# Patient Record
Sex: FEMALE | HISPANIC OR LATINO
[De-identification: names, ages, dates, MRNs, and addresses within clinical notes are randomized per-mention and may not be internally consistent; named-entity substitution may affect disease eponyms.]

---

## 2020-06-08 PROBLEM — Z00.00 ENCOUNTER FOR PREVENTIVE HEALTH EXAMINATION: Status: ACTIVE | Noted: 2020-06-08

## 2020-06-09 ENCOUNTER — APPOINTMENT (OUTPATIENT)
Dept: ENDOCRINOLOGY | Facility: CLINIC | Age: 60
End: 2020-06-09

## 2020-07-02 ENCOUNTER — APPOINTMENT (OUTPATIENT)
Dept: ENDOCRINOLOGY | Facility: CLINIC | Age: 60
End: 2020-07-02
Payer: MEDICAID

## 2020-07-02 VITALS
HEIGHT: 61 IN | WEIGHT: 134 LBS | BODY MASS INDEX: 25.3 KG/M2 | HEART RATE: 80 BPM | SYSTOLIC BLOOD PRESSURE: 126 MMHG | DIASTOLIC BLOOD PRESSURE: 78 MMHG

## 2020-07-02 LAB
GLUCOSE BLDC GLUCOMTR-MCNC: 203
HBA1C MFR BLD HPLC: 10.9

## 2020-07-02 PROCEDURE — 83036 HEMOGLOBIN GLYCOSYLATED A1C: CPT | Mod: QW

## 2020-07-02 PROCEDURE — 82962 GLUCOSE BLOOD TEST: CPT

## 2020-07-02 PROCEDURE — 99205 OFFICE O/P NEW HI 60 MIN: CPT | Mod: 25

## 2020-07-03 NOTE — PHYSICAL EXAM
[Well Nourished] : well nourished [Well Developed] : well developed [No Acute Distress] : no acute distress [Alert] : alert [EOMI] : extra ocular movement intact [No Proptosis] : no proptosis [Normal Sclera/Conjunctiva] : normal sclera/conjunctiva [No Thyroid Nodules] : no palpable thyroid nodules [Thyroid Not Enlarged] : the thyroid was not enlarged [Normal Oropharynx] : the oropharynx was normal [No Accessory Muscle Use] : no accessory muscle use [No Respiratory Distress] : no respiratory distress [Regular Rhythm] : with a regular rhythm [Normal Rate] : heart rate was normal [Clear to Auscultation] : lungs were clear to auscultation bilaterally [Normal S1, S2] : normal S1 and S2 [No Edema] : no peripheral edema [Normal Bowel Sounds] : normal bowel sounds [Pedal Pulses Normal] : the pedal pulses are present [Not Distended] : not distended [Normal Anterior Cervical Nodes] : no anterior cervical lymphadenopathy [Not Tender] : non-tender [Soft] : abdomen soft [No Spinal Tenderness] : no spinal tenderness [Normal Posterior Cervical Nodes] : no posterior cervical lymphadenopathy [No Stigmata of Cushings Syndrome] : no stigmata of Cushings Syndrome [Spine Straight] : spine straight [Normal Strength/Tone] : muscle strength and tone were normal [Normal Gait] : normal gait [No Rash] : no rash [Acanthosis Nigricans] : no acanthosis nigricans [Normal Reflexes] : deep tendon reflexes were 2+ and symmetric [No Tremors] : no tremors [Oriented x3] : oriented to person, place, and time

## 2020-07-03 NOTE — HISTORY OF PRESENT ILLNESS
[FreeTextEntry1] : 59 y/o F w/ Hx of DM2. HTN, HLD\par here for initial evaluation and management of metabolic complaints\par generally feels well and endorses no acute complaints.\par reports diabetes diagnosis ~ 25 y/a during last pregnancy. notes she had always been on pills up until 10 years ago. has been on levemir since, now using it as 30 units BID, but openly admits to skipping doses ~ 4 times weekly. Also on janumet 50/1000 mg BID. good tolerance reported. no known micro macro vascular complications . no past DKA  ep. no hypoglycemia reported, but she seldom monitors at home. She otherwise denies any f/c, CP, SOB, palpitations, tremors, depressed mood, anxiety, palpitations, n/v, stool/urinary abn, skin/weight changes, heat/cold intolerance, HAs, breast/nipple changes, polyuria/polydipsia/nocturia or other complaints.\par \par

## 2020-07-03 NOTE — ASSESSMENT
[Diabetes Foot Care] : diabetes foot care [Long Term Vascular Complications] : long term vascular complications of diabetes [Importance of Diet and Exercise] : importance of diet and exercise to improve glycemic control, achieve weight loss and improve cardiovascular health [Carbohydrate Consistent Diet] : carbohydrate consistent diet [Hypoglycemia Management] : hypoglycemia management [Action and use of Insulin] : action and use of short and long-acting insulin [Self Monitoring of Blood Glucose] : self monitoring of blood glucose [Other____] : Risks and benefits of [unfilled] was discussed with the patient. [FreeTextEntry1] : 1) DM2: Uncontrolled, A1C  target of <7%. Natural hx of the disease and importance of treatment targets discussed at length, she verbalized understanding. ADA diet and importance of exercise discussed at length. Plan is to increase metformin to full dose and introduce GLP-1 agonist today (Trulicity). Stop Janumet and reduce levemir to 50 units QD, titration instructions provided. Refer to Nutritionist today. We vineet check microalbumin, lipids and labs on the NV. Discuss vaccines and podiatry/opthalmology referrals on NV (eye exam referral provided today.\par \par 2) Weight gain:  complicated by DM2. Discussed medical  strategies. Pt would like to try lifestyle modifications and GLP-1 agonist therapy at this time. Reassess on the NV for at least ~5% TBW loss.\par \par 3) Essential HTN: Pt is at goal BP and on an ACE inh. Reassess microalbumin prior to the NV.\par  \par 4) Dyslipidemia: Pt is on a moderate intensity statin. REassess lipids on the next visit. LDL target <100.\par

## 2020-07-23 ENCOUNTER — APPOINTMENT (OUTPATIENT)
Dept: ENDOCRINOLOGY | Facility: CLINIC | Age: 60
End: 2020-07-23

## 2020-09-03 ENCOUNTER — APPOINTMENT (OUTPATIENT)
Dept: ENDOCRINOLOGY | Facility: CLINIC | Age: 60
End: 2020-09-03
Payer: MEDICAID

## 2020-09-03 VITALS
HEART RATE: 83 BPM | BODY MASS INDEX: 25.13 KG/M2 | WEIGHT: 133 LBS | SYSTOLIC BLOOD PRESSURE: 123 MMHG | DIASTOLIC BLOOD PRESSURE: 74 MMHG

## 2020-09-03 LAB
GLUCOSE BLDC GLUCOMTR-MCNC: 300
HBA1C MFR BLD HPLC: 10.4

## 2020-09-03 PROCEDURE — 82962 GLUCOSE BLOOD TEST: CPT

## 2020-09-03 PROCEDURE — 99214 OFFICE O/P EST MOD 30 MIN: CPT | Mod: 25

## 2020-09-03 PROCEDURE — 83036 HEMOGLOBIN GLYCOSYLATED A1C: CPT | Mod: QW

## 2020-09-04 NOTE — PHYSICAL EXAM
[Alert] : alert [Well Nourished] : well nourished [No Acute Distress] : no acute distress [Well Developed] : well developed [Normal Sclera/Conjunctiva] : normal sclera/conjunctiva [No Proptosis] : no proptosis [EOMI] : extra ocular movement intact [Thyroid Not Enlarged] : the thyroid was not enlarged [No Thyroid Nodules] : no palpable thyroid nodules [Normal Oropharynx] : the oropharynx was normal [No Respiratory Distress] : no respiratory distress [No Accessory Muscle Use] : no accessory muscle use [Clear to Auscultation] : lungs were clear to auscultation bilaterally [Normal Rate] : heart rate was normal [Normal S1, S2] : normal S1 and S2 [No Edema] : no peripheral edema [Regular Rhythm] : with a regular rhythm [Pedal Pulses Normal] : the pedal pulses are present [Normal Bowel Sounds] : normal bowel sounds [Not Tender] : non-tender [Not Distended] : not distended [Soft] : abdomen soft [Normal Anterior Cervical Nodes] : no anterior cervical lymphadenopathy [No Spinal Tenderness] : no spinal tenderness [Spine Straight] : spine straight [No Stigmata of Cushings Syndrome] : no stigmata of Cushings Syndrome [Normal Gait] : normal gait [Normal Strength/Tone] : muscle strength and tone were normal [No Rash] : no rash [Acanthosis Nigricans] : no acanthosis nigricans [Normal Reflexes] : deep tendon reflexes were 2+ and symmetric [No Tremors] : no tremors [Oriented x3] : oriented to person, place, and time

## 2020-09-04 NOTE — ASSESSMENT
[FreeTextEntry1] : 1) DM2: Uncontrolled, A1C  target of <7%. Natural hx of the disease and importance of treatment targets discussed at length, she verbalized understanding. ADA diet and importance of exercise discussed at length. Plan is to increase metformin to full dose and increase GLP-1 agonist to max dose(Trulicity). Stop Janumet and reduce levemir to 50 units QD, titration instructions provided. Refer to Nutritionist today. We vineet check microalbumin, lipids and labs on the NV. Discuss vaccines and podiatry/opthalmology referrals on NV (eye exam referral provided today.\par \par 2) Weight gain:  complicated by DM2. Discussed medical  strategies. Pt would like to try lifestyle modifications and GLP-1 agonist therapy at this time. Reassess on the NV for at least ~5% TBW loss.\par \par 3) Essential HTN: Pt is at goal BP and on an ACE inh. Reassess microalbumin prior to the NV.\par  \par 4) Dyslipidemia: Pt is on a moderate intensity statin. REassess lipids on the next visit. LDL target <100.\par  [Diabetes Foot Care] : diabetes foot care [Long Term Vascular Complications] : long term vascular complications of diabetes [Carbohydrate Consistent Diet] : carbohydrate consistent diet [Importance of Diet and Exercise] : importance of diet and exercise to improve glycemic control, achieve weight loss and improve cardiovascular health [Hypoglycemia Management] : hypoglycemia management [Action and use of Insulin] : action and use of short and long-acting insulin [Self Monitoring of Blood Glucose] : self monitoring of blood glucose [Other____] : Risks and benefits of [unfilled] was discussed with the patient.

## 2020-09-04 NOTE — HISTORY OF PRESENT ILLNESS
[FreeTextEntry1] : 59 y/o F w/ Hx of DM2. HTN, HLD\par here for initial evaluation and management of metabolic complaints\par generally feels well and endorses no acute complaints.\par reports diabetes diagnosis ~ 25 y/a during last pregnancy. notes she had always been on pills up until 10 years ago. has been on levemir since, now using it as 30 units BID, but openly admits to skipping doses ~ 4 times weekly. Also on janumet 50/1000 mg BID. good tolerance reported. no known micro macro vascular complications . no past DKA  ep. no hypoglycemia reported, but she seldom monitors at home. \par \par 9/2020: Here for /fu, generally feels well and endorses no acute complaints. No interval events since LV. Today reports adequate tolerance to GLP-1 agonist, notes some improvement in fasting control, now consistently ~ 180. post prandial readings remain ~180. no hypoglycemia reported.\par She otherwise denies any f/c, CP, SOB, palpitations, tremors, depressed mood, anxiety, palpitations, n/v, stool/urinary abn, skin/weight changes, heat/cold intolerance, HAs, breast/nipple changes, polyuria/polydipsia/nocturia or other complaints.\par \par

## 2020-09-30 ENCOUNTER — RX RENEWAL (OUTPATIENT)
Age: 60
End: 2020-09-30

## 2020-10-27 ENCOUNTER — RX RENEWAL (OUTPATIENT)
Age: 60
End: 2020-10-27

## 2020-12-02 ENCOUNTER — APPOINTMENT (OUTPATIENT)
Dept: ENDOCRINOLOGY | Facility: CLINIC | Age: 60
End: 2020-12-02
Payer: COMMERCIAL

## 2020-12-02 VITALS
BODY MASS INDEX: 26.08 KG/M2 | WEIGHT: 138 LBS | DIASTOLIC BLOOD PRESSURE: 74 MMHG | SYSTOLIC BLOOD PRESSURE: 121 MMHG | HEART RATE: 90 BPM

## 2020-12-02 LAB
GLUCOSE BLDC GLUCOMTR-MCNC: 406
HBA1C MFR BLD HPLC: 10

## 2020-12-02 PROCEDURE — 99215 OFFICE O/P EST HI 40 MIN: CPT | Mod: 25

## 2020-12-02 PROCEDURE — 99072 ADDL SUPL MATRL&STAF TM PHE: CPT

## 2020-12-02 PROCEDURE — 83036 HEMOGLOBIN GLYCOSYLATED A1C: CPT | Mod: QW

## 2020-12-02 PROCEDURE — 82962 GLUCOSE BLOOD TEST: CPT

## 2020-12-04 NOTE — HISTORY OF PRESENT ILLNESS
[FreeTextEntry1] : 59 y/o F w/ Hx of DM2. HTN, HLD\par here for initial evaluation and management of metabolic complaints\par generally feels well and endorses no acute complaints.\par reports diabetes diagnosis ~ 25 y/a during last pregnancy. notes she had always been on pills up until 10 years ago. has been on levemir since, now using it as 30 units BID, but openly admits to skipping doses ~ 4 times weekly. Also on janumet 50/1000 mg BID. good tolerance reported. no known micro macro vascular complications . no past DKA  ep. no hypoglycemia reported, but she seldom monitors at home. \par \par 11/2020: Here for /fu, generally feels well and endorses no acute complaints. No interval events since LV. Today reports adequate tolerance to GLP-1 agonist, notes some improvement in fasting control, now consistently ~ 180. post prandial readings remain ~180. no hypoglycemia reported.\par She otherwise denies any f/c, CP, SOB, palpitations, tremors, depressed mood, anxiety, palpitations, n/v, stool/urinary abn, skin/weight changes, heat/cold intolerance, HAs, breast/nipple changes, polyuria/polydipsia/nocturia or other complaints.\par \par

## 2020-12-04 NOTE — ASSESSMENT
[FreeTextEntry1] : 1) DM2: Uncontrolled, A1C  target of <7%. Natural hx of the disease and importance of treatment targets discussed at length, she verbalized understanding. ADA diet and importance of exercise discussed at length. Plan is to continue metformin to full dose and continue GLP-1 agonist (Trulicity). Stop levemir to 50 units QD, switch to humalog 75/25 mixed insulin, 25 units BID AC. titration instructions provided. Refer to Nutritionist today. We vineet check microalbumin, lipids and labs on the NV. Discuss vaccines and podiatry/opthalmology referrals on NV (eye exam referral provided today.\par \par 2) Weight gain:  complicated by DM2. Discussed medical  strategies. Pt would like to try lifestyle modifications and GLP-1 agonist therapy at this time. Reassess on the NV for at least ~5% TBW loss.\par \par 3) Essential HTN: Pt is at goal BP and on an ACE inh. Reassess microalbumin prior to the NV.\par  \par 4) Dyslipidemia: Pt is on a moderate intensity statin. REassess lipids on the next visit. LDL target <100.\par  [Diabetes Foot Care] : diabetes foot care [Long Term Vascular Complications] : long term vascular complications of diabetes [Carbohydrate Consistent Diet] : carbohydrate consistent diet [Importance of Diet and Exercise] : importance of diet and exercise to improve glycemic control, achieve weight loss and improve cardiovascular health [Hypoglycemia Management] : hypoglycemia management [Action and use of Insulin] : action and use of short and long-acting insulin [Self Monitoring of Blood Glucose] : self monitoring of blood glucose [Other____] : Risks and benefits of [unfilled] was discussed with the patient.

## 2021-02-17 ENCOUNTER — APPOINTMENT (OUTPATIENT)
Dept: ENDOCRINOLOGY | Facility: CLINIC | Age: 61
End: 2021-02-17

## 2021-02-23 ENCOUNTER — RX RENEWAL (OUTPATIENT)
Age: 61
End: 2021-02-23

## 2021-04-20 ENCOUNTER — RX RENEWAL (OUTPATIENT)
Age: 61
End: 2021-04-20

## 2021-09-13 ENCOUNTER — RX RENEWAL (OUTPATIENT)
Age: 61
End: 2021-09-13

## 2021-09-16 ENCOUNTER — RX RENEWAL (OUTPATIENT)
Age: 61
End: 2021-09-16

## 2021-09-28 ENCOUNTER — APPOINTMENT (OUTPATIENT)
Dept: ENDOCRINOLOGY | Facility: CLINIC | Age: 61
End: 2021-09-28
Payer: COMMERCIAL

## 2021-09-28 VITALS
BODY MASS INDEX: 25.7 KG/M2 | DIASTOLIC BLOOD PRESSURE: 76 MMHG | HEART RATE: 85 BPM | WEIGHT: 136 LBS | SYSTOLIC BLOOD PRESSURE: 117 MMHG

## 2021-09-28 DIAGNOSIS — E11.65 TYPE 2 DIABETES MELLITUS WITH UNSPECIFIED COMPLICATIONS: ICD-10-CM

## 2021-09-28 DIAGNOSIS — E11.8 TYPE 2 DIABETES MELLITUS WITH UNSPECIFIED COMPLICATIONS: ICD-10-CM

## 2021-09-28 LAB
GLUCOSE BLDC GLUCOMTR-MCNC: 203
HBA1C MFR BLD HPLC: 9.9

## 2021-09-28 PROCEDURE — 83036 HEMOGLOBIN GLYCOSYLATED A1C: CPT | Mod: QW

## 2021-09-28 PROCEDURE — 99215 OFFICE O/P EST HI 40 MIN: CPT | Mod: 25

## 2021-09-28 PROCEDURE — 36415 COLL VENOUS BLD VENIPUNCTURE: CPT

## 2021-09-28 PROCEDURE — 82962 GLUCOSE BLOOD TEST: CPT

## 2021-09-28 RX ORDER — INSULIN DETEMIR 100 [IU]/ML
100 INJECTION, SOLUTION SUBCUTANEOUS
Qty: 45 | Refills: 0 | Status: COMPLETED | COMMUNITY
Start: 2020-07-02 | End: 2021-09-28

## 2021-09-30 NOTE — HISTORY OF PRESENT ILLNESS
[FreeTextEntry1] : 61 y/o F w/ Hx of DM2. HTN, HLD\par here for initial evaluation and management of metabolic complaints\par generally feels well and endorses no acute complaints.\par reports diabetes diagnosis ~ 25 y/a during last pregnancy. notes she had always been on pills up until 10 years ago. has been on levemir since, now using it as 30 units BID, but openly admits to skipping doses ~ 4 times weekly. Also on janumet 50/1000 mg BID. good tolerance reported. no known micro macro vascular complications . no past DKA  ep. no hypoglycemia reported, but she seldom monitors at home. \par \par 9/2021: Here to re establish care,  lost to f/u x 1 year, generally feels well and endorses no acute complaints. No interval events since LV. Today reports adequate tolerance to GLP-1 agonist, notes some improvement in fasting control, now consistently ~ 180. post prandial readings remain ~180. no hypoglycemia reported. does note that she has been skipping some mixed insulin doses, and is always using it 30 minutes after meals. denies hypoglycemia\par She otherwise denies any f/c, CP, SOB, palpitations, tremors, depressed mood, anxiety, palpitations, n/v, stool/urinary abn, skin/weight changes, heat/cold intolerance, HAs, breast/nipple changes, polyuria/polydipsia/nocturia or other complaints.\par \par

## 2021-09-30 NOTE — ASSESSMENT
[FreeTextEntry1] : 1) DM2: Uncontrolled, A1C  target of <7%. Natural hx of the disease and importance of treatment targets discussed at length, she verbalized understanding. ADA diet and importance of exercise discussed at length. Plan is to continue metformin to full dose and increase GLP-1 agonist (Trulicity 3 mg, as she has failed 1.5 mg dose). , switch to before meal dosing of humalog 75/25 mixed insulin, 30 units BID AC. titration instructions provided. Refer to Nutritionist today. We vineet check microalbumin, lipids and labs on the NV. Discuss vaccines and podiatry/opthalmology referrals on NV. Given high glucose variability in spite of adherence to FSG monitoring 4 times daily, will Rx CGM sensor.\par \par 2) Weight gain:  complicated by DM2. Discussed medical  strategies. Pt would like to try lifestyle modifications and GLP-1 agonist therapy at this time. Reassess on the NV for at least ~5% TBW loss.\par \par 3) Essential HTN: Pt is at goal BP and on an ACE inh. Reassess microalbumin prior to the NV.\par  \par 4) Dyslipidemia: Pt is on a moderate intensity statin. REassess lipids on the next visit. LDL target <100.\par  [Diabetes Foot Care] : diabetes foot care [Long Term Vascular Complications] : long term vascular complications of diabetes [Carbohydrate Consistent Diet] : carbohydrate consistent diet [Importance of Diet and Exercise] : importance of diet and exercise to improve glycemic control, achieve weight loss and improve cardiovascular health [Hypoglycemia Management] : hypoglycemia management [Action and use of Insulin] : action and use of short and long-acting insulin [Self Monitoring of Blood Glucose] : self monitoring of blood glucose [Other____] : Risks and benefits of [unfilled] was discussed with the patient.

## 2021-10-07 LAB
25(OH)D3 SERPL-MCNC: 16.1 NG/ML
ALBUMIN SERPL ELPH-MCNC: 4.5 G/DL
ALP BLD-CCNC: 112 U/L
ALT SERPL-CCNC: 37 U/L
ANION GAP SERPL CALC-SCNC: 17 MMOL/L
AST SERPL-CCNC: 28 U/L
BILIRUB SERPL-MCNC: <0.2 MG/DL
BUN SERPL-MCNC: 14 MG/DL
CALCIUM SERPL-MCNC: 10.4 MG/DL
CHLORIDE SERPL-SCNC: 97 MMOL/L
CHOLEST SERPL-MCNC: 407 MG/DL
CO2 SERPL-SCNC: 24 MMOL/L
CREAT SERPL-MCNC: 0.71 MG/DL
CREAT SPEC-SCNC: 125 MG/DL
FOLATE SERPL-MCNC: 16.7 NG/ML
GLUCOSE SERPL-MCNC: 185 MG/DL
HDLC SERPL-MCNC: 68 MG/DL
LDLC SERPL CALC-MCNC: 274 MG/DL
MICROALBUMIN 24H UR DL<=1MG/L-MCNC: 12.5 MG/DL
MICROALBUMIN/CREAT 24H UR-RTO: 100 MG/G
NONHDLC SERPL-MCNC: 339 MG/DL
POTASSIUM SERPL-SCNC: 4.7 MMOL/L
PROT SERPL-MCNC: 7.6 G/DL
SODIUM SERPL-SCNC: 138 MMOL/L
T4 FREE SERPL-MCNC: 1.2 NG/DL
TRIGL SERPL-MCNC: 326 MG/DL
TSH SERPL-ACNC: 1.82 UIU/ML
VIT B12 SERPL-MCNC: 503 PG/ML

## 2021-12-29 ENCOUNTER — APPOINTMENT (OUTPATIENT)
Dept: ENDOCRINOLOGY | Facility: CLINIC | Age: 61
End: 2021-12-29

## 2022-02-23 ENCOUNTER — APPOINTMENT (OUTPATIENT)
Dept: ENDOCRINOLOGY | Facility: CLINIC | Age: 62
End: 2022-02-23

## 2022-03-16 ENCOUNTER — RX RENEWAL (OUTPATIENT)
Age: 62
End: 2022-03-16

## 2022-06-18 ENCOUNTER — RX RENEWAL (OUTPATIENT)
Age: 62
End: 2022-06-18

## 2022-06-21 ENCOUNTER — RX RENEWAL (OUTPATIENT)
Age: 62
End: 2022-06-21

## 2022-12-20 ENCOUNTER — RX RENEWAL (OUTPATIENT)
Age: 62
End: 2022-12-20

## 2024-02-27 ENCOUNTER — APPOINTMENT (OUTPATIENT)
Dept: ENDOCRINOLOGY | Facility: CLINIC | Age: 64
End: 2024-02-27
Payer: MEDICARE

## 2024-02-27 VITALS
WEIGHT: 130 LBS | DIASTOLIC BLOOD PRESSURE: 66 MMHG | BODY MASS INDEX: 24.56 KG/M2 | SYSTOLIC BLOOD PRESSURE: 127 MMHG | HEART RATE: 85 BPM

## 2024-02-27 LAB — GLUCOSE BLDC GLUCOMTR-MCNC: 413

## 2024-02-27 PROCEDURE — G2211 COMPLEX E/M VISIT ADD ON: CPT | Mod: NC,1L

## 2024-02-27 PROCEDURE — 82962 GLUCOSE BLOOD TEST: CPT

## 2024-02-27 PROCEDURE — 99215 OFFICE O/P EST HI 40 MIN: CPT | Mod: 25

## 2024-02-27 RX ORDER — PEN NEEDLE, DIABETIC 29 G X1/2"
32G X 4 MM NEEDLE, DISPOSABLE MISCELLANEOUS
Qty: 2 | Refills: 3 | Status: ACTIVE | COMMUNITY
Start: 2024-02-27 | End: 1900-01-01

## 2024-02-27 RX ORDER — INSULIN LISPRO 100 [IU]/ML
(75-25) 100 INJECTION, SUSPENSION SUBCUTANEOUS
Qty: 4 | Refills: 1 | Status: ACTIVE | COMMUNITY
Start: 2020-12-02 | End: 1900-01-01

## 2024-02-27 RX ORDER — DULAGLUTIDE 3 MG/.5ML
3 INJECTION, SOLUTION SUBCUTANEOUS
Qty: 3 | Refills: 1 | Status: COMPLETED | COMMUNITY
Start: 2020-07-02 | End: 2024-02-27

## 2024-02-27 NOTE — PHYSICAL EXAM
[Alert] : alert [Well Nourished] : well nourished [No Acute Distress] : no acute distress [Well Developed] : well developed [Normal Sclera/Conjunctiva] : normal sclera/conjunctiva [EOMI] : extra ocular movement intact [No Proptosis] : no proptosis [Normal Oropharynx] : the oropharynx was normal [Thyroid Not Enlarged] : the thyroid was not enlarged [No Thyroid Nodules] : no palpable thyroid nodules [No Respiratory Distress] : no respiratory distress [No Accessory Muscle Use] : no accessory muscle use [Clear to Auscultation] : lungs were clear to auscultation bilaterally [Normal S1, S2] : normal S1 and S2 [Normal Rate] : heart rate was normal [Regular Rhythm] : with a regular rhythm [No Edema] : no peripheral edema [Normal Bowel Sounds] : normal bowel sounds [Pedal Pulses Normal] : the pedal pulses are present [Not Tender] : non-tender [Not Distended] : not distended [Soft] : abdomen soft [No Spinal Tenderness] : no spinal tenderness [Normal Anterior Cervical Nodes] : no anterior cervical lymphadenopathy [Spine Straight] : spine straight [No Stigmata of Cushings Syndrome] : no stigmata of Cushings Syndrome [Normal Strength/Tone] : muscle strength and tone were normal [Normal Gait] : normal gait [No Rash] : no rash [Acanthosis Nigricans] : no acanthosis nigricans [Normal Reflexes] : deep tendon reflexes were 2+ and symmetric [No Tremors] : no tremors [Oriented x3] : oriented to person, place, and time

## 2024-02-27 NOTE — HISTORY OF PRESENT ILLNESS
[FreeTextEntry1] : 63 y/o F w/ Hx of DM2. HTN, HLD here for initial evaluation and management of metabolic complaints generally feels well and endorses no acute complaints. reports diabetes diagnosis ~ 25 y/a during last pregnancy. notes she had always been on pills up until 10 years ago. has been on levemir since, now using it as 30 units BID, but openly admits to skipping doses ~ 4 times weekly. Also on janumet 50/1000 mg BID. good tolerance reported. no known micro macro vascular complications . no past DKA  ep. no hypoglycemia reported, but she seldom monitors at home.   2/2024: Here to re establish care,  lost to f/u x 3 year, generally feels well and endorses no acute complaints. No interval events since LV. Today reports cessation of GLP-1 agonist 3 years ago, admits to ongoing un compliance w/ all meds. post prandial readings remain ~400, but she seldom checks. no hypoglycemia reported. does note that she has been skipping some mixed insulin doses, admits to only using it before dinner, and is always using it 30 minutes after meals. denies hypoglycemia She otherwise denies any f/c, CP, SOB, palpitations, tremors, depressed mood, anxiety, palpitations, n/v, stool/urinary abn, skin/weight changes, heat/cold intolerance, HAs, breast/nipple changes, polyuria/polydipsia/nocturia or other complaints.

## 2024-02-27 NOTE — ASSESSMENT
[Diabetes Foot Care] : diabetes foot care [Long Term Vascular Complications] : long term vascular complications of diabetes [Carbohydrate Consistent Diet] : carbohydrate consistent diet [Importance of Diet and Exercise] : importance of diet and exercise to improve glycemic control, achieve weight loss and improve cardiovascular health [Hypoglycemia Management] : hypoglycemia management [Action and use of Insulin] : action and use of short and long-acting insulin [Self Monitoring of Blood Glucose] : self monitoring of blood glucose [Other____] : Risks and benefits of [unfilled] was discussed with the patient. [FreeTextEntry1] : 1) DM2: Uncontrolled, A1C  target of <7%. Natural hx of the disease and importance of treatment targets discussed at length, she verbalized understanding but is unable to commit to improved compliance. denies SIHI. risks of ongoing un compliance w/ insulin reviewed at [facility] with her and her daughter. risks include CV disease, diabetic coma and death. ADA diet and importance of exercise discussed at length. Plan is to hold metformin pending labs, switch to before meal dosing of humalog 75/25 mixed insulin, 34 units BID AC. titration instructions provided. Refer to Nutritionist today. We vineet check microalbumin, lipids and labs on the NV. Discuss vaccines and podiatry/opthalmology referrals on NV.   2) Weight loss, likely from catabolic state, r/o thyroid dysfunction.  3) Essential HTN: Pt is at goal BP and on an ACE inh. Reassess microalbumin prior to the NV.   4) Dyslipidemia: Pt is on a moderate intensity statin. REassess lipids on the next visit. LDL target <100.

## 2024-03-07 RX ORDER — INSULIN LISPRO 100 [IU]/ML
(75-25) 100 INJECTION, SUSPENSION SUBCUTANEOUS
Qty: 4 | Refills: 1 | Status: ACTIVE | COMMUNITY
Start: 2024-03-07 | End: 1900-01-01

## 2024-03-12 RX ORDER — INSULIN ASPART 100 [IU]/ML
(70-30) 100 INJECTION, SUSPENSION SUBCUTANEOUS
Qty: 4 | Refills: 3 | Status: ACTIVE | COMMUNITY
Start: 2024-03-12 | End: 1900-01-01

## 2024-05-01 ENCOUNTER — APPOINTMENT (OUTPATIENT)
Dept: ENDOCRINOLOGY | Facility: CLINIC | Age: 64
End: 2024-05-01
Payer: MEDICARE

## 2024-05-01 VITALS
BODY MASS INDEX: 25.13 KG/M2 | HEART RATE: 82 BPM | SYSTOLIC BLOOD PRESSURE: 120 MMHG | DIASTOLIC BLOOD PRESSURE: 70 MMHG | WEIGHT: 133 LBS

## 2024-05-01 DIAGNOSIS — I10 ESSENTIAL (PRIMARY) HYPERTENSION: ICD-10-CM

## 2024-05-01 DIAGNOSIS — E11.65 TYPE 2 DIABETES MELLITUS WITH HYPERGLYCEMIA: ICD-10-CM

## 2024-05-01 DIAGNOSIS — E78.5 HYPERLIPIDEMIA, UNSPECIFIED: ICD-10-CM

## 2024-05-01 DIAGNOSIS — Z91.199 PATIENT'S NONCOMPLIANCE WITH OTHER MEDICAL TREATMENT AND REGIMEN DUE TO UNSPECIFIED REASON: ICD-10-CM

## 2024-05-01 DIAGNOSIS — Z79.4 TYPE 2 DIABETES MELLITUS WITH HYPERGLYCEMIA: ICD-10-CM

## 2024-05-01 DIAGNOSIS — E66.9 OBESITY, UNSPECIFIED: ICD-10-CM

## 2024-05-01 PROCEDURE — 82962 GLUCOSE BLOOD TEST: CPT

## 2024-05-01 PROCEDURE — 99215 OFFICE O/P EST HI 40 MIN: CPT

## 2024-05-01 PROCEDURE — 83036 HEMOGLOBIN GLYCOSYLATED A1C: CPT | Mod: QW

## 2024-05-01 PROCEDURE — G2211 COMPLEX E/M VISIT ADD ON: CPT

## 2024-05-03 NOTE — HISTORY OF PRESENT ILLNESS
[FreeTextEntry1] : 65 y/o F w/ Hx of DM2. HTN, HLD here for initial evaluation and management of metabolic complaints generally feels well and endorses no acute complaints. reports diabetes diagnosis ~ 25 y/a during last pregnancy. notes she had always been on pills up until 10 years ago. has been on levemir since, now using it as 30 units BID, but openly admits to skipping doses ~ 4 times weekly. Also on janumet 50/1000 mg BID. good tolerance reported. no known micro macro vascular complications . no past DKA  ep. no hypoglycemia reported, but she seldom monitors at home.   5/2024: Here for f/u, generally feels well and endorses no acute complaints. No interval events since LV. Today reports cessation of GLP-1 agonist 3 years ago, admits to ongoing un compliance w/ all meds. post prandial readings remain ~400, but she seldom checks. no hypoglycemia reported. does note that she has been skipping some mixed insulin doses, admits to only using it before dinner, and is always using it 30 minutes after meals. denies hypoglycemia She otherwise denies any f/c, CP, SOB, palpitations, tremors, depressed mood, anxiety, palpitations, n/v, stool/urinary abn, skin/weight changes, heat/cold intolerance, HAs, breast/nipple changes, polyuria/polydipsia/nocturia or other complaints.

## 2024-05-03 NOTE — ASSESSMENT
[Diabetes Foot Care] : diabetes foot care [Long Term Vascular Complications] : long term vascular complications of diabetes [Carbohydrate Consistent Diet] : carbohydrate consistent diet [Importance of Diet and Exercise] : importance of diet and exercise to improve glycemic control, achieve weight loss and improve cardiovascular health [Hypoglycemia Management] : hypoglycemia management [Action and use of Insulin] : action and use of short and long-acting insulin [Self Monitoring of Blood Glucose] : self monitoring of blood glucose [Other____] : Risks and benefits of [unfilled] was discussed with the patient. [FreeTextEntry1] : 1) DM2: Uncontrolled, A1C  target of <7%. Natural hx of the disease and importance of treatment targets discussed at length, she verbalized understanding but is unable to commit to improved compliance. denies SIHI. risks of ongoing un compliance w/ insulin reviewed at [facility] with her and her daughter. risks include CV disease, diabetic coma and death. ADA diet and importance of exercise discussed at length. Plan is to hold metformin pending labs, switch to before meal dosing of humalog 75/25 mixed insulin, 40 units BID AC. titration instructions provided. Refer to Nutritionist today. We vineet check microalbumin, lipids and labs on the NV. Discuss vaccines and podiatry/opthalmology referrals on NV.   2) Weight loss, likely from catabolic state, r/o thyroid dysfunction.  3) Essential HTN: Pt is at goal BP and on an ACE inh. Reassess microalbumin prior to the NV.   4) Dyslipidemia: Pt is on a moderate intensity statin. REassess lipids on the next visit. LDL target <100.

## 2024-05-08 LAB
ALBUMIN SERPL ELPH-MCNC: 4.2 G/DL
ALP BLD-CCNC: 156 U/L
ALT SERPL-CCNC: 15 U/L
ANION GAP SERPL CALC-SCNC: 15 MMOL/L
AST SERPL-CCNC: 15 U/L
BILIRUB SERPL-MCNC: 0.2 MG/DL
BUN SERPL-MCNC: 17 MG/DL
C PEPTIDE SERPL-MCNC: 7.1 NG/ML
CALCIUM SERPL-MCNC: 10 MG/DL
CHLORIDE SERPL-SCNC: 96 MMOL/L
CHOLEST SERPL-MCNC: 362 MG/DL
CO2 SERPL-SCNC: 21 MMOL/L
CREAT SERPL-MCNC: 0.76 MG/DL
CREAT SPEC-SCNC: 72 MG/DL
EGFR: 86 ML/MIN/1.73M2
FOLATE SERPL-MCNC: >20 NG/ML
GAD65 AB SER-MCNC: 0.01 NMOL/L
GLUCOSE BLDC GLUCOMTR-MCNC: 358
GLUCOSE SERPL-MCNC: 344 MG/DL
HBA1C MFR BLD HPLC: 12.4
HDLC SERPL-MCNC: 57 MG/DL
LDLC SERPL CALC-MCNC: 222 MG/DL
MICROALBUMIN 24H UR DL<=1MG/L-MCNC: 12.7 MG/DL
MICROALBUMIN/CREAT 24H UR-RTO: 177 MG/G
NONHDLC SERPL-MCNC: 305 MG/DL
PANC ISLET CELL AB SER QL: NORMAL
POTASSIUM SERPL-SCNC: 5.3 MMOL/L
PROT SERPL-MCNC: 8 G/DL
SODIUM SERPL-SCNC: 132 MMOL/L
T4 FREE SERPL-MCNC: 1.2 NG/DL
TRIGL SERPL-MCNC: 390 MG/DL
TSH SERPL-ACNC: 2.83 UIU/ML
VIT B12 SERPL-MCNC: 687 PG/ML

## 2024-05-08 RX ORDER — ROSUVASTATIN CALCIUM 40 MG/1
40 TABLET, FILM COATED ORAL DAILY
Qty: 90 | Refills: 3 | Status: ACTIVE | COMMUNITY
Start: 2021-10-07 | End: 1900-01-01

## 2024-05-08 RX ORDER — METFORMIN ER 750 MG 750 MG/1
750 TABLET ORAL DAILY
Qty: 180 | Refills: 1 | Status: ACTIVE | COMMUNITY
Start: 2020-07-02 | End: 1900-01-01

## 2024-05-14 LAB — ZINC TRANSPORTER 8 AB: <15 U/ML

## 2024-07-05 DIAGNOSIS — Z79.4 TYPE 2 DIABETES MELLITUS WITH HYPERGLYCEMIA: ICD-10-CM

## 2024-07-05 DIAGNOSIS — E11.65 TYPE 2 DIABETES MELLITUS WITH HYPERGLYCEMIA: ICD-10-CM

## 2024-07-05 RX ORDER — BLOOD-GLUCOSE SENSOR
EACH MISCELLANEOUS
Qty: 6 | Refills: 2 | Status: ACTIVE | COMMUNITY
Start: 2024-07-05 | End: 1900-01-01

## 2024-08-27 ENCOUNTER — RX RENEWAL (OUTPATIENT)
Age: 64
End: 2024-08-27

## 2024-09-03 ENCOUNTER — APPOINTMENT (OUTPATIENT)
Dept: ENDOCRINOLOGY | Facility: CLINIC | Age: 64
End: 2024-09-03
Payer: MEDICARE

## 2024-09-03 ENCOUNTER — RESULT CHARGE (OUTPATIENT)
Age: 64
End: 2024-09-03

## 2024-09-03 VITALS
SYSTOLIC BLOOD PRESSURE: 132 MMHG | HEART RATE: 94 BPM | WEIGHT: 127 LBS | BODY MASS INDEX: 24 KG/M2 | DIASTOLIC BLOOD PRESSURE: 65 MMHG

## 2024-09-03 DIAGNOSIS — E11.65 TYPE 2 DIABETES MELLITUS WITH HYPERGLYCEMIA: ICD-10-CM

## 2024-09-03 DIAGNOSIS — I10 ESSENTIAL (PRIMARY) HYPERTENSION: ICD-10-CM

## 2024-09-03 DIAGNOSIS — E78.5 HYPERLIPIDEMIA, UNSPECIFIED: ICD-10-CM

## 2024-09-03 DIAGNOSIS — Z79.4 TYPE 2 DIABETES MELLITUS WITH HYPERGLYCEMIA: ICD-10-CM

## 2024-09-03 DIAGNOSIS — Z91.199 PATIENT'S NONCOMPLIANCE WITH OTHER MEDICAL TREATMENT AND REGIMEN DUE TO UNSPECIFIED REASON: ICD-10-CM

## 2024-09-03 DIAGNOSIS — E66.9 OBESITY, UNSPECIFIED: ICD-10-CM

## 2024-09-03 PROCEDURE — 99215 OFFICE O/P EST HI 40 MIN: CPT

## 2024-09-03 PROCEDURE — G2211 COMPLEX E/M VISIT ADD ON: CPT

## 2024-09-04 LAB
GLUCOSE BLDC GLUCOMTR-MCNC: 470
HBA1C MFR BLD HPLC: >14

## 2024-09-04 NOTE — HISTORY OF PRESENT ILLNESS
[FreeTextEntry1] : 63 y/o F w/ Hx of DM2. HTN, HLD here for initial evaluation and management of metabolic complaints generally feels well and endorses no acute complaints. reports diabetes diagnosis ~ 25 y/a during last pregnancy. notes she had always been on pills up until 10 years ago. has been on levemir since, now using it as 30 units BID, but openly admits to skipping doses ~ 4 times weekly. Also on janumet 50/1000 mg BID. good tolerance reported. no known micro macro vascular complications . no past DKA  ep. no hypoglycemia reported, but she seldom monitors at home.   9/2024: Here for f/u, generally feels well and endorses no acute complaints. Interval b/l revascularization procedures for worsening PAD, now on Plavix. worsening claudication. Today once again admits to ongoing un compliance w/ all meds. post prandial readings remain ~400, but she seldom checks. no hypoglycemia reported. does note that she has been skipping some mixed insulin doses, admits to only using it before dinner, and is always using it 30 minutes after meals. denies hypoglycemia. reports compliance w/ metformin. call to pharmacy on file confirms she has not picked up insulin or metformin over the last 4 months. She otherwise denies any f/c, CP, SOB, palpitations, tremors, depressed mood, anxiety, palpitations, n/v, stool/urinary abn, skin/weight changes, heat/cold intolerance, HAs, breast/nipple changes, polyuria/polydipsia/nocturia or other complaints.

## 2024-09-04 NOTE — ASSESSMENT
[Diabetes Foot Care] : diabetes foot care [Long Term Vascular Complications] : long term vascular complications of diabetes [Carbohydrate Consistent Diet] : carbohydrate consistent diet [Importance of Diet and Exercise] : importance of diet and exercise to improve glycemic control, achieve weight loss and improve cardiovascular health [Hypoglycemia Management] : hypoglycemia management [Action and use of Insulin] : action and use of short and long-acting insulin [Self Monitoring of Blood Glucose] : self monitoring of blood glucose [Other____] : Risks and benefits of [unfilled] was discussed with the patient. [FreeTextEntry1] : 1) DM2: Uncontrolled, A1C  target of <7%. Natural hx of the disease and importance of treatment targets discussed at length, she verbalized understanding but is unable to commit to improved compliance. denies SIHI. risks of ongoing un compliance w/ insulin reviewed at [facility] with her and her daughter. risks include CV disease, diabetic coma and death. ADA diet and importance of exercise discussed at length. Plan is to hold metformin pending labs, switch to before meal dosing of humalog 75/25 mixed insulin, 40 units BID AC. titration instructions provided. Refer to Nutritionist today. We vineet check microalbumin, lipids and labs on the NV. Discuss vaccines and podiatry/opthalmology referrals on NV.   2) Weight loss, likely from catabolic state, r/o thyroid dysfunction.  3) Essential HTN: Pt is at goal BP and on an ACE inh. Reassess microalbumin prior to the NV.   4) Dyslipidemia: Pt is on a moderate intensity statin but is not compliant per self admission. REassess lipids on the next visit. LDL target <100.

## 2024-09-30 ENCOUNTER — NON-APPOINTMENT (OUTPATIENT)
Age: 64
End: 2024-09-30